# Patient Record
Sex: FEMALE | Race: OTHER | NOT HISPANIC OR LATINO | URBAN - METROPOLITAN AREA
[De-identification: names, ages, dates, MRNs, and addresses within clinical notes are randomized per-mention and may not be internally consistent; named-entity substitution may affect disease eponyms.]

---
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
## 2019-05-13 ENCOUNTER — HOSPITAL ENCOUNTER (EMERGENCY)
Dept: HOSPITAL 87 - ER | Age: 19
Discharge: LEFT BEFORE DISCHARGE | End: 2019-05-13
Payer: MEDICAID

## 2019-05-13 VITALS — SYSTOLIC BLOOD PRESSURE: 132 MMHG | DIASTOLIC BLOOD PRESSURE: 63 MMHG

## 2019-05-13 VITALS — WEIGHT: 160.94 LBS | HEIGHT: 71 IN | BODY MASS INDEX: 22.53 KG/M2

## 2019-05-13 DIAGNOSIS — Z53.21: Primary | ICD-10-CM

## 2019-11-12 NOTE — ED PROVIDER NOTE - OBJECTIVE STATEMENT
Resolved vitritis. Continue Atropine to QD OU. 16 yo female presents with tampon stuck in vagina x 2 days, denies fever, denies discharge, mild pain with urination.  states she had sex over the weekend and forgot it was there tried to remove it herself, did not feel string.  recently moved here from [place], no pmd, no gyn.  smokes marijuana,

## 2021-05-31 ENCOUNTER — HOSPITAL ENCOUNTER (EMERGENCY)
Dept: HOSPITAL 27 - EMS | Age: 21
Discharge: LEFT BEFORE DISCHARGE | End: 2021-05-31
Payer: MEDICAID

## 2021-05-31 VITALS — WEIGHT: 160.34 LBS | HEIGHT: 70 IN | BODY MASS INDEX: 22.95 KG/M2

## 2021-05-31 VITALS — DIASTOLIC BLOOD PRESSURE: 79 MMHG | SYSTOLIC BLOOD PRESSURE: 134 MMHG

## 2021-05-31 DIAGNOSIS — F14.90: ICD-10-CM

## 2021-05-31 DIAGNOSIS — F41.9: Primary | ICD-10-CM

## 2021-05-31 PROCEDURE — 99281 EMR DPT VST MAYX REQ PHY/QHP: CPT

## 2021-05-31 PROCEDURE — Z7610: HCPCS

## 2021-05-31 PROCEDURE — Z7502: HCPCS

## 2021-06-28 NOTE — ED PROVIDER NOTE - SKIN NEGATIVE STATEMENT, MLM
Chart and labs reviewed for length of stay. No nutrition intervention indicated at this time. RD available via consult. Will continue to follow per protocol.      no abrasions, no jaundice, no lesions, no pruritis, and no rashes.